# Patient Record
Sex: FEMALE | HISPANIC OR LATINO | URBAN - METROPOLITAN AREA
[De-identification: names, ages, dates, MRNs, and addresses within clinical notes are randomized per-mention and may not be internally consistent; named-entity substitution may affect disease eponyms.]

---

## 2021-07-28 ENCOUNTER — APPOINTMENT (RX ONLY)
Dept: URBAN - METROPOLITAN AREA CLINIC 51 | Facility: CLINIC | Age: 36
Setting detail: DERMATOLOGY
End: 2021-07-28

## 2021-07-28 DIAGNOSIS — L82.1 OTHER SEBORRHEIC KERATOSIS: ICD-10-CM

## 2021-07-28 DIAGNOSIS — D485 NEOPLASM OF UNCERTAIN BEHAVIOR OF SKIN: ICD-10-CM

## 2021-07-28 DIAGNOSIS — L81.4 OTHER MELANIN HYPERPIGMENTATION: ICD-10-CM

## 2021-07-28 DIAGNOSIS — D18.0 HEMANGIOMA: ICD-10-CM

## 2021-07-28 DIAGNOSIS — D22 MELANOCYTIC NEVI: ICD-10-CM

## 2021-07-28 PROBLEM — D48.5 NEOPLASM OF UNCERTAIN BEHAVIOR OF SKIN: Status: ACTIVE | Noted: 2021-07-28

## 2021-07-28 PROBLEM — D18.01 HEMANGIOMA OF SKIN AND SUBCUTANEOUS TISSUE: Status: ACTIVE | Noted: 2021-07-28

## 2021-07-28 PROBLEM — D22.9 MELANOCYTIC NEVI, UNSPECIFIED: Status: ACTIVE | Noted: 2021-07-28

## 2021-07-28 PROCEDURE — ? COUNSELING

## 2021-07-28 PROCEDURE — 99243 OFF/OP CNSLTJ NEW/EST LOW 30: CPT

## 2021-07-28 PROCEDURE — ? CONSULTATION EXCISION

## 2021-07-28 NOTE — PROCEDURE: CONSULTATION EXCISION
Anatomic Location From Referring Provider: right axilla
X Size Of Lesion In Cm (Optional): 0
Detail Level: Detailed
Size Of Lesion: 7